# Patient Record
Sex: MALE | Race: WHITE | NOT HISPANIC OR LATINO | Employment: OTHER | URBAN - NONMETROPOLITAN AREA
[De-identification: names, ages, dates, MRNs, and addresses within clinical notes are randomized per-mention and may not be internally consistent; named-entity substitution may affect disease eponyms.]

---

## 2018-09-08 ENCOUNTER — HISTORICAL (OUTPATIENT)
Dept: ADMINISTRATIVE | Facility: HOSPITAL | Age: 63
End: 2018-09-08

## 2018-10-15 ENCOUNTER — HISTORICAL (OUTPATIENT)
Dept: ADMINISTRATIVE | Facility: HOSPITAL | Age: 63
End: 2018-10-15

## 2020-01-09 ENCOUNTER — HISTORICAL (OUTPATIENT)
Dept: ADMINISTRATIVE | Facility: HOSPITAL | Age: 65
End: 2020-01-09

## 2023-07-17 ENCOUNTER — HOSPITAL ENCOUNTER (OUTPATIENT)
Dept: RADIOLOGY | Facility: HOSPITAL | Age: 68
Discharge: HOME | End: 2023-07-17
Attending: FAMILY MEDICINE
Payer: MEDICARE

## 2023-07-17 DIAGNOSIS — M25.512 LEFT SHOULDER PAIN: ICD-10-CM

## 2023-07-17 PROCEDURE — 73030 X-RAY EXAM OF SHOULDER: CPT | Mod: TC,LT

## 2023-07-20 ENCOUNTER — HOSPITAL ENCOUNTER (OUTPATIENT)
Dept: RADIOLOGY | Facility: HOSPITAL | Age: 68
Discharge: HOME | End: 2023-07-20
Attending: FAMILY MEDICINE
Payer: MEDICARE

## 2023-07-20 DIAGNOSIS — M25.512 LEFT SHOULDER PAIN: ICD-10-CM

## 2023-07-20 PROCEDURE — 73221 MRI JOINT UPR EXTREM W/O DYE: CPT | Mod: TC,LT

## 2023-08-30 DIAGNOSIS — M54.50 CHRONIC LOW BACK PAIN, UNSPECIFIED BACK PAIN LATERALITY, UNSPECIFIED WHETHER SCIATICA PRESENT: Primary | ICD-10-CM

## 2023-08-30 DIAGNOSIS — G89.29 CHRONIC LOW BACK PAIN, UNSPECIFIED BACK PAIN LATERALITY, UNSPECIFIED WHETHER SCIATICA PRESENT: Primary | ICD-10-CM

## 2023-10-18 ENCOUNTER — OFFICE VISIT (OUTPATIENT)
Dept: PAIN MEDICINE | Facility: CLINIC | Age: 68
End: 2023-10-18
Payer: MEDICARE

## 2023-10-18 VITALS
HEIGHT: 71 IN | OXYGEN SATURATION: 98 % | DIASTOLIC BLOOD PRESSURE: 91 MMHG | BODY MASS INDEX: 29.54 KG/M2 | SYSTOLIC BLOOD PRESSURE: 138 MMHG | WEIGHT: 211 LBS | HEART RATE: 86 BPM

## 2023-10-18 DIAGNOSIS — Z79.891 LONG TERM (CURRENT) USE OF OPIATE ANALGESIC: ICD-10-CM

## 2023-10-18 DIAGNOSIS — G89.4 CHRONIC PAIN SYNDROME: ICD-10-CM

## 2023-10-18 DIAGNOSIS — M43.16 SPONDYLOLISTHESIS OF LUMBAR REGION: ICD-10-CM

## 2023-10-18 DIAGNOSIS — G89.29 CHRONIC BILATERAL LOW BACK PAIN WITH BILATERAL SCIATICA: ICD-10-CM

## 2023-10-18 DIAGNOSIS — F17.200 SMOKER: ICD-10-CM

## 2023-10-18 DIAGNOSIS — M47.816 LUMBAR SPONDYLOSIS: ICD-10-CM

## 2023-10-18 DIAGNOSIS — R53.81 PHYSICAL DECONDITIONING: Primary | ICD-10-CM

## 2023-10-18 DIAGNOSIS — F41.9 ANXIETY: ICD-10-CM

## 2023-10-18 DIAGNOSIS — M54.42 CHRONIC BILATERAL LOW BACK PAIN WITH BILATERAL SCIATICA: ICD-10-CM

## 2023-10-18 DIAGNOSIS — M96.1 FAILED BACK SURGICAL SYNDROME: ICD-10-CM

## 2023-10-18 DIAGNOSIS — M54.41 CHRONIC BILATERAL LOW BACK PAIN WITH BILATERAL SCIATICA: ICD-10-CM

## 2023-10-18 DIAGNOSIS — M53.3 SACROILIAC JOINT DYSFUNCTION: ICD-10-CM

## 2023-10-18 DIAGNOSIS — I73.9 PAD (PERIPHERAL ARTERY DISEASE): ICD-10-CM

## 2023-10-18 PROCEDURE — 4010F PR ACE/ARB THEARPY RXD/TAKEN: ICD-10-PCS | Mod: CPTII,S$GLB,, | Performed by: PHYSICAL MEDICINE & REHABILITATION

## 2023-10-18 PROCEDURE — 3008F PR BODY MASS INDEX (BMI) DOCUMENTED: ICD-10-PCS | Mod: CPTII,S$GLB,, | Performed by: PHYSICAL MEDICINE & REHABILITATION

## 2023-10-18 PROCEDURE — 1160F PR REVIEW ALL MEDS BY PRESCRIBER/CLIN PHARMACIST DOCUMENTED: ICD-10-PCS | Mod: CPTII,S$GLB,, | Performed by: PHYSICAL MEDICINE & REHABILITATION

## 2023-10-18 PROCEDURE — 99205 PR OFFICE/OUTPT VISIT, NEW, LEVL V, 60-74 MIN: ICD-10-PCS | Mod: S$GLB,,, | Performed by: PHYSICAL MEDICINE & REHABILITATION

## 2023-10-18 PROCEDURE — 3080F PR MOST RECENT DIASTOLIC BLOOD PRESSURE >= 90 MM HG: ICD-10-PCS | Mod: CPTII,S$GLB,, | Performed by: PHYSICAL MEDICINE & REHABILITATION

## 2023-10-18 PROCEDURE — 3080F DIAST BP >= 90 MM HG: CPT | Mod: CPTII,S$GLB,, | Performed by: PHYSICAL MEDICINE & REHABILITATION

## 2023-10-18 PROCEDURE — 3008F BODY MASS INDEX DOCD: CPT | Mod: CPTII,S$GLB,, | Performed by: PHYSICAL MEDICINE & REHABILITATION

## 2023-10-18 PROCEDURE — 3075F PR MOST RECENT SYSTOLIC BLOOD PRESS GE 130-139MM HG: ICD-10-PCS | Mod: CPTII,S$GLB,, | Performed by: PHYSICAL MEDICINE & REHABILITATION

## 2023-10-18 PROCEDURE — 4010F ACE/ARB THERAPY RXD/TAKEN: CPT | Mod: CPTII,S$GLB,, | Performed by: PHYSICAL MEDICINE & REHABILITATION

## 2023-10-18 PROCEDURE — 99205 OFFICE O/P NEW HI 60 MIN: CPT | Mod: S$GLB,,, | Performed by: PHYSICAL MEDICINE & REHABILITATION

## 2023-10-18 PROCEDURE — 1160F RVW MEDS BY RX/DR IN RCRD: CPT | Mod: CPTII,S$GLB,, | Performed by: PHYSICAL MEDICINE & REHABILITATION

## 2023-10-18 PROCEDURE — 3075F SYST BP GE 130 - 139MM HG: CPT | Mod: CPTII,S$GLB,, | Performed by: PHYSICAL MEDICINE & REHABILITATION

## 2023-10-18 PROCEDURE — 1159F PR MEDICATION LIST DOCUMENTED IN MEDICAL RECORD: ICD-10-PCS | Mod: CPTII,S$GLB,, | Performed by: PHYSICAL MEDICINE & REHABILITATION

## 2023-10-18 PROCEDURE — 1159F MED LIST DOCD IN RCRD: CPT | Mod: CPTII,S$GLB,, | Performed by: PHYSICAL MEDICINE & REHABILITATION

## 2023-10-18 RX ORDER — ATORVASTATIN CALCIUM 40 MG/1
40 TABLET, FILM COATED ORAL NIGHTLY
COMMUNITY
Start: 2023-10-09

## 2023-10-18 RX ORDER — QUETIAPINE FUMARATE 50 MG/1
TABLET, FILM COATED ORAL
COMMUNITY
Start: 2023-10-09

## 2023-10-18 RX ORDER — GABAPENTIN 600 MG/1
600 TABLET ORAL 3 TIMES DAILY
COMMUNITY
Start: 2023-10-14

## 2023-10-18 RX ORDER — CARVEDILOL 6.25 MG/1
TABLET ORAL
COMMUNITY
Start: 2023-10-05

## 2023-10-18 RX ORDER — OXYCODONE AND ACETAMINOPHEN 10; 325 MG/1; MG/1
TABLET ORAL
COMMUNITY
Start: 2023-09-18

## 2023-10-18 RX ORDER — AMLODIPINE BESYLATE 10 MG/1
10 TABLET ORAL NIGHTLY
COMMUNITY
Start: 2023-09-06

## 2023-10-18 NOTE — PROGRESS NOTES
"[facility]      Referring Provider: Jun Aleman Iii, Md  [address] [place] [address]    Chief Complaint:   Chief Complaint   Patient presents with    Back Pain       History of Present Illness: Jay Hale is a 67 y.o. male referred by Dr. Jun Aleman for back pain.      Onset: x years, had surgery about 26 years ago   Location: bilateral low back  Radiation: to bilateral legs, primarily the left leg  Timing: constant  Quality: Burning and Stabbing  Exacerbating Factors: walking and standing  Alleviating Factors: medications  Associated Symptoms: He gets weakness and numbness in both legs. He denies night fever/night sweats, urinary incontinence/change in function, bowel incontinence/change in function, and unexplained weight loss    He tried to get MRI of his low back but felt something "pulling" him off the table and had to stop the MRI. However, he was able to get the MRI of his shoulder.     He has not done PT in many years. He takes percocet 10/325 mg TID PRN which helps some. However, he was previously on methadone 10 mg QID from Dr. Duarte. Prior to that he was getting 480 methadone and 120 roxicodone 20 mg per month from Dr. Candace Navarro, but he lost his license because 2 patients  in their sleep.     He states his current function is severely limiting and he states all he does is sit all day due to the pain.       Severity: Currently: 7/10   Typical Range: 7-8/10     Exacerbation: 8/10     P = 7  E = 8  G = 9  Baseline PEG Score = 8  Current PEG Score: 8    Opioid Risk Score         Value Time User    Opioid Risk Score  1 10/18/2023 10:23 AM Monique Harp MA             Previous Interventions:  -     Previous Therapies:  PT/OT: yes years ago  Relevant Surgery: yes    - L4-S1 bony fusion in  with Dr. Yuriy Navarro in [place], Bone stimulator in place.   Previous Medications:   - NSAIDS:   - Muscle Relaxants:    - TCAs:   - SNRIs:   - Topicals:   - " Anticonvulsants: gabapentin   - Opioids: oxycodone. methadone    Current Pain Medications:  Percocet  TID  Gabapentin 600 mg TID      Blood Thinners: ASA 81mg (Cardiologist- Dr Lomeli in [place])    Full Medication List:    Current Outpatient Medications:     amLODIPine (NORVASC) 10 MG tablet, Take 10 mg by mouth every evening., Disp: , Rfl:     atorvastatin (LIPITOR) 40 MG tablet, Take 40 mg by mouth every evening., Disp: , Rfl:     carvediloL (COREG) 6.25 MG tablet, TAKE 1 TABLET BY MOUTH TWO TIMES A DAY after meals, Disp: , Rfl:     gabapentin (NEURONTIN) 600 MG tablet, Take 600 mg by mouth 3 (three) times daily., Disp: , Rfl:     oxyCODONE-acetaminophen (PERCOCET)  mg per tablet, TAKE 1 TABLET BY MOUTH EVERY EIGHT HOURS AS NEEDED FOR PAIN, Disp: , Rfl:     QUEtiapine (SEROQUEL) 50 MG tablet, TAKE 1 TABLET BY MOUTH AT BEDTIME. may increase to 2 TABLETS AT BEDTIME after 2 to 3 days if needed, Disp: , Rfl:      Review of Systems: See HPI    Allergies:  Patient has no known allergies.     Medical History:   has a past medical history of (HFpEF) heart failure with preserved ejection fraction, Anxiety, Carotid stenosis, Coronary artery disease, Dysthymic disorder, Heart attack, Hodgkin lymphoma, unspecified, unspecified site, Hyperlipidemia, Hypertension, Lumbar spondylosis, Other specified noninfective gastroenteritis and colitis, Peripheral arterial disease, and Peripheral neuropathy.    Surgical History:   has a past surgical history that includes Ankle fracture surgery; Rotator cuff repair; and Exploratory laparotomy (N/A).    Family History:  family history includes Cancer in his father; Heart disease in his brother; Hypertension in his father and mother.    Social History:   reports that he has been smoking cigarettes. He started smoking about 50 years ago. He has a 25.4 pack-year smoking history. He has never used smokeless tobacco. He reports that he does not currently use alcohol.    Physical  "Exam:  BP (!) 138/91   Pulse 86   Ht 5' 11" (1.803 m)   Wt 95.7 kg (211 lb)   SpO2 98%   BMI 29.43 kg/m²   GEN: No acute distress. Calm, comfortable  HENT: Normocephalic, atraumatic, moist mucous membranes  EYE: Anicteric sclera, non-injected.   CV: Non-diaphoretic. Regular Rate. Radial Pulses 2+.  RESP: Breathing comfortably. Chest expansion symmetric.  EXT: No clubbing, cyanosis.   SKIN: Warm, & dry to palpation. No visible rashes or lesions of exposed skin.   PSYCH: Pleasant mood and appropriate affect. Recent and remote memory intact.   GAIT: Independent, slowed and antalgic ambulation  Lumbar Spine Exam:       Inspection: No erythema, bruising. Healed midline surgical incision      Palpation: (+) TTP of lumbar paraspinals and SIJ b/l with significant atrophy of b/l paraspinals       ROM:  Limited in flexion, extension, lateral bending.       (+) Facet loading bilaterally      (+) Straight Leg Raise bilaterally      (+) NIKA bilaterally  Hip Exam:      Inspection: No gross deformity or apparent leg length discrepancy      Palpation: (+) TTP to left greater trochanteric bursa.       ROM: (+) limitation & pain in internal rotation, external rotation b/l (b/l buttocks pain)  Neurologic Exam:     Alert. Speech is fluent and appropriate.     Strength: 4/5 in LLE and 5/5 RLE      Sensation:  Grossly intact to light touch in bilateral lower extremities     Reflexes: 2+ in b/l patella, achilles     Tone: No abnormality appreciated in bilateral lower extremities     No Clonus     Downgoing toes on plantar stimulation     (-) Khan bilaterally                Imaging:  - X-ray lumbar spine 10/18/23:  The vertebral bodies demonstrate a normal height.  Moderate multilevel disc space narrowing seen involving the visualized lower thoracic/upper to mid lumbar spine.  The overall appearance is similar to the prior exam.  There is a couple mm of retrolisthesis of L2 on L3 and L3 on L4.  There appears to be attempted " "osseous fusion at the L4 through S1 levels bilaterally.  Electrodes seen terminating posterior to the L4-5 disc space similar in appearance to the prior exam.  Vascular calcifications are noted.    - X-ray lumbar spine 1/9/20:  MISCELLANEOUS: An electronic stimulator is present with the distal tip located at the L4-L5 level with what appears to be postoperative changes compatible with a previous posterior fusion at the L4-S1 levels.  Fairly prominent vertebral body and facet joint spurring noted throughout the lumbar spine.  Atherosclerotic calcifications are noted within the abdominal aorta and its primary branches. FRACTURES:   No definite acute fractures noted.   ALIGNMENT: No significant spondylolisthesis noted.   SOFT TISSUE:  There is no focal soft tissue swelling or paravertebral hematomas noted.     Labs:  BMP  No results found for: "NA", "K", "CL", "CO2", "BUN", "CREATININE", "CALCIUM", "ANIONGAP", "EGFRNORACEVR"  No results found for: "ALT", "AST", "GGT", "ALKPHOS", "BILITOT"  No results found for: "PLT"    Assessment:  Jay Hale is a 67 y.o. male with the following diagnoses based on history, exam, and imaging:    Problem List Items Addressed This Visit          Neuro    Chronic pain syndrome    Relevant Orders    X-Ray Lumbar Complete Including Flex And Ext (Completed)       Psychiatric    Long term (current) use of opiate analgesic    Relevant Orders    X-Ray Lumbar Complete Including Flex And Ext (Completed)       Cardiac/Vascular    PAD (peripheral artery disease)       Orthopedic    Chronic bilateral low back pain with bilateral sciatica    Relevant Orders    Ambulatory referral/consult to Physical/Occupational Therapy    X-Ray Lumbar Complete Including Flex And Ext (Completed)    Failed back surgical syndrome    Relevant Orders    Ambulatory referral/consult to Physical/Occupational Therapy    X-Ray Lumbar Complete Including Flex And Ext (Completed)       Other    Physical deconditioning - " Primary    Relevant Orders    Ambulatory referral/consult to Physical/Occupational Therapy    X-Ray Lumbar Complete Including Flex And Ext (Completed)    Smoker     Other Visit Diagnoses       Sacroiliac joint dysfunction        Relevant Orders    X-Ray Lumbar Complete Including Flex And Ext (Completed)    Lumbar spondylosis        Relevant Orders    X-Ray Lumbar Complete Including Flex And Ext (Completed)    Anxiety        Spondylolisthesis of lumbar region                This is a pleasant 67 y.o. gentleman presenting with:     - Chronic bilateral low back pain and bilateral leg pains (left worse than right): Prior L4-S1 fusion and patient is significantly deconditioned with apparent atrophy in his legs and lumbar spine which I suspect is primarily due to disuse as patient notes he is very sedentary due to pain.   - Chronic opiate use: Patient is currently taking Percocet 10/325 mg TID PRN, but it does not appear to be making any significant improvement in his functional status as he reports spending most of his day sitting/reclining.  I have no objective evidence for the need of these medications, outside of that he has already been on them for years. He has been on chronic opioids for over 12 years (as far as I can see on ) and was originally managed by Dr. Candace Navarro who lost his license due to 2 of his patients deaths due to overdose with mixture of methadone, oxycodone and benzodiazepine medications per review of [facility] release.    - Comorbidities: Anxiety. HFpEF. CAD c/b MI on ASA. PAD. HTN. Smoker. H/o Non-hodgkin's lymphoma. GERD.     Treatment Plan:   - PT/OT/HEP: Refer to PT. Discussed benefits of exercise for pain.   - We discussed the importance of smoking cessation regarding chronic pain. I specifically discussed the poor outcomes related to smoking and spine related pain.  Patient expressed understanding.    - Offered referral to smoking cessation, but patient declined.   - Procedures: Consider  LALY depending on MRI results.   - Medications: No changes recommended at this time.  - Imaging: Reviewed. X-ray lumbar spine. Plan for lumbar MRI if no improvement with PT.   - Labs: Request CBC, CMP from PCP.     Follow Up: RTC in 2-3 months or sooner PRN    I spent greater than 60 minutes in total in todays visit including face-to-face time with the patient, and time reviewing records/imaging/labs, and documenting.       Jess Jacobs M.D.  Interventional Pain Medicine / Physical Medicine & Rehabilitation

## 2024-05-01 DIAGNOSIS — Z87.891 HISTORY OF TOBACCO USE: Primary | ICD-10-CM

## 2024-05-02 ENCOUNTER — HOSPITAL ENCOUNTER (OUTPATIENT)
Dept: RADIOLOGY | Facility: HOSPITAL | Age: 69
Discharge: HOME | End: 2024-05-02
Attending: FAMILY MEDICINE
Payer: MEDICARE

## 2024-05-02 DIAGNOSIS — M47.16 SPONDYLOSIS WITH MYELOPATHY, LUMBAR REGION: ICD-10-CM

## 2024-05-02 PROCEDURE — 72131 CT LUMBAR SPINE W/O DYE: CPT | Mod: TC

## 2024-05-13 ENCOUNTER — HOSPITAL ENCOUNTER (OUTPATIENT)
Dept: RADIOLOGY | Facility: HOSPITAL | Age: 69
Discharge: HOME | End: 2024-05-13
Attending: FAMILY MEDICINE
Payer: MEDICARE

## 2024-05-13 DIAGNOSIS — Z87.891 HISTORY OF TOBACCO USE: ICD-10-CM

## 2024-05-13 PROCEDURE — 71271 CT THORAX LUNG CANCER SCR C-: CPT | Mod: TC

## 2024-12-24 ENCOUNTER — HOSPITAL ENCOUNTER (EMERGENCY)
Facility: HOSPITAL | Age: 69
Discharge: HOME | End: 2024-12-24
Attending: FAMILY MEDICINE
Payer: MEDICARE

## 2024-12-24 VITALS
DIASTOLIC BLOOD PRESSURE: 66 MMHG | TEMPERATURE: 98 F | HEART RATE: 80 BPM | WEIGHT: 204 LBS | BODY MASS INDEX: 28.56 KG/M2 | RESPIRATION RATE: 18 BRPM | OXYGEN SATURATION: 96 % | HEIGHT: 71 IN | SYSTOLIC BLOOD PRESSURE: 103 MMHG

## 2024-12-24 DIAGNOSIS — M54.30 SCIATICA, UNSPECIFIED LATERALITY: Primary | ICD-10-CM

## 2024-12-24 PROCEDURE — 99284 EMERGENCY DEPT VISIT MOD MDM: CPT | Mod: 25

## 2024-12-24 PROCEDURE — 96372 THER/PROPH/DIAG INJ SC/IM: CPT | Performed by: FAMILY MEDICINE

## 2024-12-24 PROCEDURE — 63600175 PHARM REV CODE 636 W HCPCS: Performed by: FAMILY MEDICINE

## 2024-12-24 RX ORDER — OXYCODONE AND ACETAMINOPHEN 5; 325 MG/1; MG/1
1 TABLET ORAL EVERY 4 HOURS PRN
Qty: 10 TABLET | Refills: 0 | Status: SHIPPED | OUTPATIENT
Start: 2024-12-24

## 2024-12-24 RX ORDER — KETOROLAC TROMETHAMINE 30 MG/ML
30 INJECTION, SOLUTION INTRAMUSCULAR; INTRAVENOUS
Status: COMPLETED | OUTPATIENT
Start: 2024-12-24 | End: 2024-12-24

## 2024-12-24 RX ORDER — CYCLOBENZAPRINE HCL 10 MG
10 TABLET ORAL 3 TIMES DAILY PRN
Qty: 15 TABLET | Refills: 0 | Status: SHIPPED | OUTPATIENT
Start: 2024-12-24 | End: 2024-12-29

## 2024-12-24 RX ADMIN — KETOROLAC TROMETHAMINE 30 MG: 30 INJECTION, SOLUTION INTRAMUSCULAR at 08:12

## 2024-12-24 NOTE — ED PROVIDER NOTES
"Encounter Date: 12/24/2024       History     Chief Complaint   Patient presents with    Hip Pain     Pt presented to ED per POV with c/o left hip pain radiating to left lower leg. Pt stated "it's my sciatic nerve, they usually given me Toradol to help it".      Patient presents evaluation of left sciatica.  Patient has longstanding history.  Today's flare-up is similar to previous episodes.  Having sharp severe pain having any saddle anesthesia urinary patient denies fevers chills.  Patient denies having any other associated symptoms at present.    The history is provided by the patient.     Review of patient's allergies indicates:  No Known Allergies  Past Medical History:   Diagnosis Date    (HFpEF) heart failure with preserved ejection fraction     Anxiety     Carotid stenosis     Coronary artery disease     Dysthymic disorder     Heart attack     Hodgkin lymphoma, unspecified, unspecified site     Hyperlipidemia     Hypertension     Lumbar spondylosis     Other specified noninfective gastroenteritis and colitis     Peripheral arterial disease     Peripheral neuropathy      Past Surgical History:   Procedure Laterality Date    ANKLE FRACTURE SURGERY      bilateral, x6    EXPLORATORY LAPAROTOMY N/A     ROTATOR CUFF REPAIR       Family History   Problem Relation Name Age of Onset    Hypertension Mother      Cancer Father      Hypertension Father      Heart disease Brother       Social History     Tobacco Use    Smoking status: Every Day     Current packs/day: 0.50     Average packs/day: 0.5 packs/day for 52.0 years (26.0 ttl pk-yrs)     Types: Cigarettes     Start date: 1973    Smokeless tobacco: Never   Substance Use Topics    Alcohol use: Not Currently     Review of Systems   Constitutional: Negative.    HENT: Negative.     Eyes: Negative.    Respiratory: Negative.     Cardiovascular: Negative.    Gastrointestinal: Negative.    Endocrine: Negative.    Genitourinary: Negative.    Musculoskeletal:         Sciatica "   Skin: Negative.    Allergic/Immunologic: Negative.    Neurological: Negative.    Hematological: Negative.    Psychiatric/Behavioral: Negative.         Physical Exam     Initial Vitals [12/24/24 0831]   BP Pulse Resp Temp SpO2   106/71 82 18 98 °F (36.7 °C) 99 %      MAP       --         Physical Exam    Vitals reviewed.  Constitutional: He appears well-developed and well-nourished. He is not diaphoretic. No distress.   HENT:   Head: Normocephalic and atraumatic. Mouth/Throat: No oropharyngeal exudate.   Eyes: EOM are normal. Pupils are equal, round, and reactive to light. Right eye exhibits no discharge. Left eye exhibits no discharge.   Neck: Neck supple. No thyromegaly present. No tracheal deviation present.   Normal range of motion.  Cardiovascular:  Normal rate, regular rhythm and normal heart sounds.           Pulmonary/Chest: Breath sounds normal. No stridor. No respiratory distress. He has no wheezes. He has no rales.   Abdominal: Abdomen is soft. Bowel sounds are normal. He exhibits no distension. There is no abdominal tenderness. There is no rebound.   Musculoskeletal:         General: Tenderness present.      Cervical back: Normal range of motion and neck supple.      Comments: Reduced ROM Left Hip     Neurological: He is alert and oriented to person, place, and time. He has normal reflexes. He displays normal reflexes. No cranial nerve deficit. GCS score is 15. GCS eye subscore is 4. GCS verbal subscore is 5. GCS motor subscore is 6.   Skin: Skin is warm and dry. Capillary refill takes less than 2 seconds.   Psychiatric: He has a normal mood and affect.         ED Course   Procedures  Labs Reviewed - No data to display       Imaging Results    None          Medications   ketorolac injection 30 mg (30 mg Intramuscular Given 12/24/24 0829)     Medical Decision Making  Patient presents for evaluation of sciatica flare. Patient symptomatically improved with toradol. Patient discharged in good  condition.    Risk  Prescription drug management.                                      Clinical Impression:  Final diagnoses:  [M54.30] Sciatica, unspecified laterality (Primary)          ED Disposition Condition    Discharge Stable          ED Prescriptions       Medication Sig Dispense Start Date End Date Auth. Provider    oxyCODONE-acetaminophen (PERCOCET) 5-325 mg per tablet Take 1 tablet by mouth every 4 (four) hours as needed for Pain. 10 tablet 12/24/2024 -- Archie Ornelas MD    cyclobenzaprine (FLEXERIL) 10 MG tablet Take 1 tablet (10 mg total) by mouth 3 (three) times daily as needed for Muscle spasms. 15 tablet 12/24/2024 12/29/2024 Archie Ornelas MD          Follow-up Information    None          Archie Ornelas MD  12/24/24 9730

## 2025-04-30 DIAGNOSIS — Z87.891 PERSONAL HISTORY OF TOBACCO USE, PRESENTING HAZARDS TO HEALTH: Primary | ICD-10-CM
